# Patient Record
Sex: FEMALE | Race: WHITE | NOT HISPANIC OR LATINO | Employment: OTHER | URBAN - METROPOLITAN AREA
[De-identification: names, ages, dates, MRNs, and addresses within clinical notes are randomized per-mention and may not be internally consistent; named-entity substitution may affect disease eponyms.]

---

## 2019-04-22 ENCOUNTER — TRANSCRIBE ORDERS (OUTPATIENT)
Dept: ADMINISTRATIVE | Facility: HOSPITAL | Age: 84
End: 2019-04-22

## 2019-04-22 DIAGNOSIS — R26.81 UNSTEADINESS: Primary | ICD-10-CM

## 2019-04-29 ENCOUNTER — HOSPITAL ENCOUNTER (OUTPATIENT)
Dept: CARDIOLOGY | Facility: HOSPITAL | Age: 84
Discharge: HOME | End: 2019-04-29
Admitting: OTOLARYNGOLOGY

## 2019-04-29 DIAGNOSIS — R26.81 UNSTEADINESS: ICD-10-CM

## 2019-04-29 LAB
BH CV XLRA MEAS LEFT CCA RATIO VEL: 73.1 CM/SEC
BH CV XLRA MEAS LEFT DIST CCA EDV: 15.3 CM/SEC
BH CV XLRA MEAS LEFT DIST CCA PSV: 73.1 CM/SEC
BH CV XLRA MEAS LEFT DIST ICA EDV: -25.2 CM/SEC
BH CV XLRA MEAS LEFT DIST ICA PSV: -82.4 CM/SEC
BH CV XLRA MEAS LEFT ICA RATIO VEL: 87.9 CM/SEC
BH CV XLRA MEAS LEFT ICA/CCA RATIO: 1.2
BH CV XLRA MEAS LEFT MID ICA EDV: -18.8 CM/SEC
BH CV XLRA MEAS LEFT MID ICA PSV: -69.2 CM/SEC
BH CV XLRA MEAS LEFT PROX CCA EDV: 14.5 CM/SEC
BH CV XLRA MEAS LEFT PROX CCA PSV: 57.7 CM/SEC
BH CV XLRA MEAS LEFT PROX ECA EDV: -7.5 CM/SEC
BH CV XLRA MEAS LEFT PROX ECA PSV: -64 CM/SEC
BH CV XLRA MEAS LEFT PROX ICA EDV: 21.1 CM/SEC
BH CV XLRA MEAS LEFT PROX ICA PSV: 87.9 CM/SEC
BH CV XLRA MEAS LEFT PROX SCLA PSV: 114 CM/SEC
BH CV XLRA MEAS LEFT VERTEBRAL A EDV: -15.7 CM/SEC
BH CV XLRA MEAS LEFT VERTEBRAL A PSV: -51.9 CM/SEC
BH CV XLRA MEAS RIGHT CCA RATIO VEL: 49.1 CM/SEC
BH CV XLRA MEAS RIGHT DIST CCA EDV: 11.4 CM/SEC
BH CV XLRA MEAS RIGHT DIST CCA PSV: 49.1 CM/SEC
BH CV XLRA MEAS RIGHT DIST ICA EDV: -18.5 CM/SEC
BH CV XLRA MEAS RIGHT DIST ICA PSV: -81.3 CM/SEC
BH CV XLRA MEAS RIGHT ICA RATIO VEL: -94.3 CM/SEC
BH CV XLRA MEAS RIGHT ICA/CCA RATIO: -1.9
BH CV XLRA MEAS RIGHT MID ICA EDV: -21.6 CM/SEC
BH CV XLRA MEAS RIGHT MID ICA PSV: -94.3 CM/SEC
BH CV XLRA MEAS RIGHT PROX CCA EDV: 8.6 CM/SEC
BH CV XLRA MEAS RIGHT PROX CCA PSV: 47.5 CM/SEC
BH CV XLRA MEAS RIGHT PROX ECA EDV: -7.6 CM/SEC
BH CV XLRA MEAS RIGHT PROX ECA PSV: -69.8 CM/SEC
BH CV XLRA MEAS RIGHT PROX ICA EDV: -21.2 CM/SEC
BH CV XLRA MEAS RIGHT PROX ICA PSV: -77.8 CM/SEC
BH CV XLRA MEAS RIGHT PROX SCLA PSV: 104 CM/SEC
BH CV XLRA MEAS RIGHT VERTEBRAL A EDV: 11.8 CM/SEC
BH CV XLRA MEAS RIGHT VERTEBRAL A PSV: 57 CM/SEC
LEFT ARM BP: NORMAL MMHG
RIGHT ARM BP: NORMAL MMHG

## 2019-04-29 PROCEDURE — 93880 EXTRACRANIAL BILAT STUDY: CPT

## 2019-06-13 ENCOUNTER — OFFICE VISIT (OUTPATIENT)
Dept: PULMONOLOGY | Facility: CLINIC | Age: 84
End: 2019-06-13

## 2019-06-13 VITALS
HEART RATE: 71 BPM | DIASTOLIC BLOOD PRESSURE: 110 MMHG | SYSTOLIC BLOOD PRESSURE: 150 MMHG | WEIGHT: 113 LBS | OXYGEN SATURATION: 95 % | RESPIRATION RATE: 18 BRPM | HEIGHT: 59 IN | TEMPERATURE: 98.1 F | BODY MASS INDEX: 22.78 KG/M2

## 2019-06-13 DIAGNOSIS — R05.9 COUGH: Primary | ICD-10-CM

## 2019-06-13 DIAGNOSIS — R91.8 PULMONARY NODULES: ICD-10-CM

## 2019-06-13 PROCEDURE — 94726 PLETHYSMOGRAPHY LUNG VOLUMES: CPT | Performed by: INTERNAL MEDICINE

## 2019-06-13 PROCEDURE — 99203 OFFICE O/P NEW LOW 30 MIN: CPT | Performed by: INTERNAL MEDICINE

## 2019-06-13 PROCEDURE — 94729 DIFFUSING CAPACITY: CPT | Performed by: INTERNAL MEDICINE

## 2019-06-13 PROCEDURE — 94060 EVALUATION OF WHEEZING: CPT | Performed by: INTERNAL MEDICINE

## 2019-06-13 RX ORDER — IBUPROFEN 200 MG
TABLET ORAL EVERY 6 HOURS
COMMUNITY
End: 2022-10-10

## 2019-06-13 RX ORDER — SENNOSIDES 8.6 MG
1 CAPSULE ORAL DAILY PRN
COMMUNITY
End: 2021-01-26

## 2019-06-13 RX ORDER — ASPIRIN 81 MG/1
81 TABLET ORAL DAILY
COMMUNITY

## 2019-06-13 RX ORDER — HYDROCODONE BITARTRATE AND ACETAMINOPHEN 7.5; 325 MG/1; MG/1
1 TABLET ORAL EVERY 8 HOURS PRN
Refills: 0 | COMMUNITY
Start: 2019-05-11 | End: 2021-01-26

## 2019-06-13 RX ORDER — LOSARTAN POTASSIUM 50 MG/1
50 TABLET ORAL 2 TIMES DAILY
Refills: 1 | COMMUNITY
Start: 2019-05-23 | End: 2022-10-10

## 2019-06-13 RX ORDER — CIPROFLOXACIN 250 MG/1
250 TABLET, FILM COATED ORAL 2 TIMES DAILY
Refills: 0 | COMMUNITY
Start: 2019-06-08 | End: 2020-07-13

## 2019-06-13 RX ORDER — ROSUVASTATIN CALCIUM 10 MG/1
10 TABLET, COATED ORAL NIGHTLY
Refills: 1 | COMMUNITY
Start: 2019-03-16 | End: 2022-10-10 | Stop reason: SDUPTHER

## 2019-06-13 RX ORDER — CYCLOBENZAPRINE HCL 10 MG
TABLET ORAL
COMMUNITY
Start: 2019-06-11 | End: 2021-01-26

## 2019-06-13 RX ORDER — FUROSEMIDE 20 MG/1
20 TABLET ORAL 2 TIMES DAILY
COMMUNITY
End: 2022-10-10 | Stop reason: SDUPTHER

## 2019-06-13 RX ORDER — NEBIVOLOL HYDROCHLORIDE 10 MG/1
10 TABLET ORAL 2 TIMES DAILY
Refills: 1 | COMMUNITY
Start: 2019-05-23 | End: 2022-10-10 | Stop reason: SDUPTHER

## 2019-06-13 RX ORDER — LEVALBUTEROL TARTRATE 45 UG/1
3 AEROSOL, METERED ORAL ONCE
Status: COMPLETED | OUTPATIENT
Start: 2019-06-13 | End: 2019-06-13

## 2019-06-13 RX ADMIN — LEVALBUTEROL TARTRATE 3 PUFF: 45 AEROSOL, METERED ORAL at 10:51

## 2019-06-14 PROBLEM — R91.8 PULMONARY NODULES: Status: ACTIVE | Noted: 2019-06-14

## 2019-06-14 NOTE — PROGRESS NOTES
Initial Pulmonary Consult Note    Subjective   Reason for consultation/Chief Complaint: Pulmonary Nodules    Mounika Syed is a 87 y.o. female is being seen for consultation today at the request of Salo Luo, *    History of Present Illness  Ms. Syed is a 88yo F with a history of breast cancer who is referred for incidental pulmonary nodules. She had a fall in 2019 and had a CT scan of the chest performed which showed a few pulmonary nodules that ranged between 2mm and 4mm in size. She denies any shortness of breath or cough. She does have some weight loss which she attributes to lack of appetite from aging. She had breast cancer twice. The last time was 8-9 years ago and she was treated with lumpectomy and radiation. Her dad  from lung cancer in 1944.     Active Ambulatory Problems     Diagnosis Date Noted   • Pulmonary nodules 2019     Resolved Ambulatory Problems     Diagnosis Date Noted   • No Resolved Ambulatory Problems     Past Medical History:   Diagnosis Date   • Heart murmur        History reviewed. No pertinent surgical history.    History reviewed. No pertinent family history.    Social History     Socioeconomic History   • Marital status:      Spouse name: Not on file   • Number of children: Not on file   • Years of education: Not on file   • Highest education level: Not on file   Tobacco Use   • Smoking status: Current Every Day Smoker     Years: 30.00   • Smokeless tobacco: Never Used   Substance and Sexual Activity   • Alcohol use: No     Frequency: Never   • Drug use: No   • Sexual activity: Defer       No Known Allergies    Current Outpatient Medications   Medication Sig Dispense Refill   • aspirin 81 MG EC tablet Take 81 mg by mouth Daily.     • BYSTOLIC 10 MG tablet Take 10 mg by mouth 2 (Two) Times a Day.  1   • ciprofloxacin (CIPRO) 250 MG tablet Take 250 mg by mouth 2 (Two) Times a Day.  0   • cyclobenzaprine (FLEXERIL) 10 MG tablet      • furosemide  "(LASIX) 20 MG tablet Take 20 mg by mouth 2 (Two) Times a Day.     • HYDROcodone-acetaminophen (NORCO) 7.5-325 MG per tablet Take 1 tablet by mouth Every 8 (Eight) Hours As Needed.  0   • ibuprofen (ADVIL,MOTRIN) 200 MG tablet Every 6 (Six) Hours.     • losartan (COZAAR) 50 MG tablet Take 50 mg by mouth 2 (Two) Times a Day.  1   • rosuvastatin (CRESTOR) 10 MG tablet Take 10 mg by mouth Every Night.  1   • Sennosides (SENNA) 8.6 MG capsule Take 1 capsule by mouth Daily As Needed.     • VOLTAREN 1 % gel gel APPLY 2 - 4 GRAMS UP TO 4 XD TO THE PAINFUL JOINT UTD  3     No current facility-administered medications for this visit.        Review of Systems   Constitutional: Negative.    HENT: Positive for hearing loss, rhinorrhea, sinus pressure and tinnitus.    Eyes: Negative.    Respiratory: Negative.    Cardiovascular: Positive for leg swelling.   Gastrointestinal: Negative.    Endocrine: Negative.    Genitourinary: Negative.    Musculoskeletal: Positive for arthralgias and back pain.   Skin: Negative.    Allergic/Immunologic: Negative.    Neurological: Positive for dizziness.   Hematological: Negative.    Psychiatric/Behavioral: Negative.          Objective   Blood pressure (!) 150/110, pulse 71, temperature 98.1 °F (36.7 °C), resp. rate 18, height 148.6 cm (58.5\"), weight 51.3 kg (113 lb), SpO2 95 %.  Physical Exam   Constitutional: She is oriented to person, place, and time. She appears well-developed and well-nourished. No distress.   HENT:   Head: Normocephalic and atraumatic.   Mouth/Throat: Oropharynx is clear and moist.   Eyes: Conjunctivae are normal. Pupils are equal, round, and reactive to light. No scleral icterus.   Neck: Normal range of motion. Neck supple. No tracheal deviation present. No thyromegaly present.   Cardiovascular: Normal rate, regular rhythm and normal heart sounds.   Pulmonary/Chest: Effort normal and breath sounds normal. No respiratory distress.   Abdominal: Soft. Bowel sounds are normal. " There is no tenderness.   Musculoskeletal: Normal range of motion. She exhibits no edema.   Lymphadenopathy:     She has no cervical adenopathy.   Neurological: She is alert and oriented to person, place, and time. She exhibits normal muscle tone. Coordination normal.   Skin: Skin is warm and dry. No rash noted. No erythema.   Psychiatric: She has a normal mood and affect. Her speech is normal and behavior is normal. Judgment normal.   Nursing note and vitals reviewed.      PFTs:  Performed in clinic today and reviewed.   There is no airway obstruction. There is no significant response to bronchodilators.   There is no restriction. There is air trapping and hyperinflation present.   The DLCO is reduced.     Imaging:  CT Chest from April 2019 personally reviewed. There are multiple 2-4mm nodules within the left upper lobe. There is no significant adenopathy.     Assessment/Plan   Mounika was seen today for cough.    Diagnoses and all orders for this visit:    Cough  -     levalbuterol (XOPENEX HFA) inhaler 3 puff  -     Pulmonary Function Test    Pulmonary nodules        Discussion:  Ms. Syed is a 86yo F who is referred for pulmonary nodules.     1. 2-4mm Pulmonary Nodules  - She does not have any history of lung cancer. She does have a previous history of breast cancer.   - Due to her past history of cancer, we will plan to repeat a CT scan of the chest 1 year from her prior CT scan which was in April.     She will follow up after the CT scan. I have advised her to call with any questions or concerns.            Amarilis SOLIS Case, DO  Pulmonary and Critical Care Medicine  Note Electronically Signed

## 2019-06-17 DIAGNOSIS — R91.8 PULMONARY NODULES: Primary | ICD-10-CM

## 2020-05-06 ENCOUNTER — DOCUMENTATION (OUTPATIENT)
Dept: PULMONOLOGY | Facility: CLINIC | Age: 85
End: 2020-05-06

## 2020-05-06 NOTE — PROGRESS NOTES
I sent a certified letter to remind Ms. Syed to schedule her CT scan that was due in 1 year.  We will await delivery of this letter to see if patient will schedule her CT scan.

## 2020-05-07 ENCOUNTER — APPOINTMENT (OUTPATIENT)
Dept: CT IMAGING | Facility: HOSPITAL | Age: 85
End: 2020-05-07

## 2020-06-23 DIAGNOSIS — R91.8 PULMONARY NODULES: Primary | ICD-10-CM

## 2020-06-30 ENCOUNTER — TRANSCRIBE ORDERS (OUTPATIENT)
Dept: PULMONOLOGY | Facility: CLINIC | Age: 85
End: 2020-06-30

## 2020-07-13 ENCOUNTER — OFFICE VISIT (OUTPATIENT)
Dept: PULMONOLOGY | Facility: CLINIC | Age: 85
End: 2020-07-13

## 2020-07-13 VITALS
WEIGHT: 113 LBS | HEART RATE: 80 BPM | HEIGHT: 58 IN | BODY MASS INDEX: 23.72 KG/M2 | SYSTOLIC BLOOD PRESSURE: 122 MMHG | DIASTOLIC BLOOD PRESSURE: 80 MMHG | TEMPERATURE: 97.5 F | OXYGEN SATURATION: 97 %

## 2020-07-13 DIAGNOSIS — R91.8 PULMONARY NODULES: Primary | ICD-10-CM

## 2020-07-13 PROCEDURE — 99213 OFFICE O/P EST LOW 20 MIN: CPT | Performed by: NURSE PRACTITIONER

## 2020-07-13 RX ORDER — DOXAZOSIN MESYLATE 1 MG/1
1 TABLET ORAL 2 TIMES DAILY
COMMUNITY
Start: 2020-05-03 | End: 2022-10-10 | Stop reason: SDUPTHER

## 2020-07-13 NOTE — PROGRESS NOTES
"[facility]    CHIEF COMPLAINT    Pulmonary nodule    HISTORY OF PRESENT ILLNESS    Mounika Syed is a 88 y.o.female here today for follow-up of her pulmonary nodules.  She was last seen 1 year ago by Dr. Godoy.  She denies any respiratory illnesses or exacerbation since her last appointment.    She denies any changes to her medical history since her last appointment.    She denies fever, chills, sputum production, hemoptysis, night sweats, weight loss, chest pain or palpitations.  She denies any lower extremity edema or calf tenderness.  She denies any sinus or allergy symptoms.  She denies reflux symptoms.    Her biggest complaint is having pain from her arthritis.  She states that \"it hurts to sit, it hurts to move.\"    She is up-to-date on her current vaccinations.    Patient Active Problem List   Diagnosis   • Pulmonary nodules       No Known Allergies    Current Outpatient Medications:   •  aspirin 81 MG EC tablet, Take 81 mg by mouth Daily., Disp: , Rfl:   •  BYSTOLIC 10 MG tablet, Take 10 mg by mouth 2 (Two) Times a Day., Disp: , Rfl: 1  •  cyclobenzaprine (FLEXERIL) 10 MG tablet, , Disp: , Rfl:   •  doxazosin (CARDURA) 1 MG tablet, Take 1 mg by mouth 2 (Two) Times a Day., Disp: , Rfl:   •  furosemide (LASIX) 20 MG tablet, Take 20 mg by mouth 2 (Two) Times a Day., Disp: , Rfl:   •  HYDROcodone-acetaminophen (NORCO) 7.5-325 MG per tablet, Take 1 tablet by mouth Every 8 (Eight) Hours As Needed., Disp: , Rfl: 0  •  ibuprofen (ADVIL,MOTRIN) 200 MG tablet, Every 6 (Six) Hours., Disp: , Rfl:   •  losartan (COZAAR) 50 MG tablet, Take 50 mg by mouth 2 (Two) Times a Day., Disp: , Rfl: 1  •  rosuvastatin (CRESTOR) 10 MG tablet, Take 10 mg by mouth Every Night., Disp: , Rfl: 1  •  Sennosides (SENNA) 8.6 MG capsule, Take 1 capsule by mouth Daily As Needed., Disp: , Rfl:   •  VOLTAREN 1 % gel gel, APPLY 2 - 4 GRAMS UP TO 4 XD TO THE PAINFUL JOINT UTD, Disp: , Rfl: 3  MEDICATION LIST AND ALLERGIES " "REVIEWED.    Social History     Tobacco Use   • Smoking status: Current Every Day Smoker     Packs/day: 0.25     Years: 40.00     Pack years: 10.00     Types: Cigarettes   • Smokeless tobacco: Never Used   Substance Use Topics   • Alcohol use: No     Frequency: Never   • Drug use: No       FAMILY AND SOCIAL HISTORY REVIEWED.    Review of Systems   Constitutional: Negative for activity change, appetite change, fatigue, fever and unexpected weight change.   HENT: Negative for congestion, postnasal drip, rhinorrhea, sinus pressure, sore throat and voice change.    Eyes: Negative for visual disturbance.   Respiratory: Negative for cough, chest tightness, shortness of breath and wheezing.    Cardiovascular: Negative for chest pain, palpitations and leg swelling.   Gastrointestinal: Negative for abdominal distention, abdominal pain, nausea and vomiting.   Endocrine: Negative for cold intolerance and heat intolerance.   Genitourinary: Negative for difficulty urinating and urgency.   Musculoskeletal: Negative for arthralgias, back pain and neck pain.   Skin: Negative for color change and pallor.   Allergic/Immunologic: Negative for environmental allergies and food allergies.   Neurological: Negative for dizziness, syncope, weakness and light-headedness.   Hematological: Negative for adenopathy. Does not bruise/bleed easily.   Psychiatric/Behavioral: Negative for agitation and behavioral problems.   .    /80   Pulse 80   Temp 97.5 °F (36.4 °C)   Ht 147.3 cm (58\")   Wt 51.3 kg (113 lb)   LMP  (LMP Unknown)   SpO2 97% Comment: resting, room air  Breastfeeding No   BMI 23.62 kg/m²     Immunization History   Administered Date(s) Administered   • Flu Vaccine Quad PF >36MO 10/01/2014, 10/09/2015, 10/21/2016   • Fluzone High Dose =>65 Years ([facility] ONLY) 10/13/2018, 09/11/2019   • Hepatitis A 08/02/2018   • Pneumococcal Conjugate 13-Valent (PCV13) 11/21/2014   • Pneumococcal Polysaccharide (PPSV23) 11/01/2009 "       Physical Exam   Constitutional: She is oriented to person, place, and time. She appears well-developed and well-nourished.   HENT:   Head: Normocephalic and atraumatic.   Eyes: Pupils are equal, round, and reactive to light.   Neck: Normal range of motion. Neck supple. No thyromegaly present.   Cardiovascular: Normal rate, regular rhythm, normal heart sounds and intact distal pulses. Exam reveals no gallop and no friction rub.   No murmur heard.  Pulmonary/Chest: Effort normal and breath sounds normal. No respiratory distress. She has no wheezes. She has no rales. She exhibits no tenderness.   Abdominal: Soft. Bowel sounds are normal. There is no tenderness.   Musculoskeletal: Normal range of motion. She exhibits no edema.   Lymphadenopathy:     She has no cervical adenopathy.   Neurological: She is alert and oriented to person, place, and time.   Skin: Skin is warm and dry. Capillary refill takes less than 2 seconds. She is not diaphoretic.   Psychiatric: She has a normal mood and affect. Her behavior is normal.   Nursing note and vitals reviewed.        RESULTS    CT of the chest without contrast on 7/10/2020: Official report pending    PROBLEM LIST    Problem List Items Addressed This Visit        Respiratory    Pulmonary nodules - Primary            DISCUSSION    Ms. Syed is here for a follow-up CT scan that she had been last week.  We are following pulmonary nodules that she had last year.  I did review the CT scan images myself and it appears that they are unchanged.  I would like to get the report from the radiologist to compare and if they are changed or anything abnormal is present I will notify the patient.  I do not suspect that we need to repeat this scan at this time.  I will notify the patient if we need to do a repeat CT scan.    She will follow-up as needed in our office or sooner if her symptoms worsen.  I did advise her to call with any additional concerns or questions.  I spent 15 minutes  with the patient. I spent > 50% percent of this time counseling and discussing diagnosis, prognosis, diagnostic testing, evaluation, current status, treatment options and management.    Amy Guzman, JAEL  07/13/20203:27 PM  Electronically signed     Please note that portions of this note were completed with a voice recognition program. Efforts were made to edit the dictations, but occasionally words are mistranscribed.      CC: Salo Luo MD

## 2020-07-16 DIAGNOSIS — R91.8 PULMONARY NODULES: ICD-10-CM

## 2020-07-16 DIAGNOSIS — Z00.6 EXAMINATION FOR NORMAL COMPARISON FOR CLINICAL RESEARCH: Primary | ICD-10-CM

## 2021-01-12 ENCOUNTER — OFFICE VISIT (OUTPATIENT)
Dept: PULMONOLOGY | Facility: CLINIC | Age: 86
End: 2021-01-12

## 2021-01-12 VITALS
WEIGHT: 121 LBS | HEIGHT: 58 IN | BODY MASS INDEX: 25.4 KG/M2 | TEMPERATURE: 96.6 F | DIASTOLIC BLOOD PRESSURE: 90 MMHG | OXYGEN SATURATION: 95 % | SYSTOLIC BLOOD PRESSURE: 150 MMHG | HEART RATE: 71 BPM

## 2021-01-12 DIAGNOSIS — I10 ESSENTIAL HYPERTENSION: ICD-10-CM

## 2021-01-12 DIAGNOSIS — R91.8 PULMONARY NODULES: ICD-10-CM

## 2021-01-12 DIAGNOSIS — R05.9 COUGH: Primary | ICD-10-CM

## 2021-01-12 PROCEDURE — 99214 OFFICE O/P EST MOD 30 MIN: CPT | Performed by: NURSE PRACTITIONER

## 2021-01-12 RX ORDER — BENZONATATE 100 MG/1
100 CAPSULE ORAL 3 TIMES DAILY PRN
Qty: 42 CAPSULE | Refills: 3 | Status: SHIPPED | OUTPATIENT
Start: 2021-01-12 | End: 2021-01-12 | Stop reason: SDUPTHER

## 2021-01-12 RX ORDER — BENZONATATE 100 MG/1
100 CAPSULE ORAL 3 TIMES DAILY PRN
Qty: 42 CAPSULE | Refills: 3 | Status: SHIPPED | OUTPATIENT
Start: 2021-01-12 | End: 2021-01-26

## 2021-01-12 NOTE — PROGRESS NOTES
"[facility] Follow up      Chief Complaint  Lung Nodule and Follow-up    Subjective          Mounika Syed presents to [facility] for a chronic cough.  She states she has had a dry cough croupy type cough for the last 6 months.  She is followed up with her primary care.  She has tried with some allergy medication and nasal spray without change in the cough.  She says the cough has never produced any sputum.  She denies any sinus drainage or postnasal drainage.    She denies any reflux symptoms or dysphagia symptoms.  She denies associate the cough around eating.    She does think the cough is worse towards the evening.    She denies any wheezing.  She denies any shortness of breath however her mobility is very limited secondary to chronic spine issues and chronic pain.    She denies any lower extremity edema, chest pain or pressure.    She was seen here in the office for a incidental pulmonary nodule and April 2019.  She had a few scattered pulmonary nodule that ranged from 2 mm to 4 mm in size.  Her follow-up CT scan in June 2020 showed a calcified granuloma in the right upper lobe and some nodular scarring in the medial segment of the right middle lobe but no nodules.      Objective     Vital Signs:   /90   Pulse 71   Temp 96.6 °F (35.9 °C)   Ht 147.3 cm (58\")   Wt 54.9 kg (121 lb)   SpO2 95% Comment: resting, room air  BMI 25.29 kg/m²       Physical Exam  Vitals signs reviewed.   Constitutional:       General: She is not in acute distress.     Appearance: She is well-developed. She is not ill-appearing.   HENT:      Head: Normocephalic and atraumatic.      Mouth/Throat:      Mouth: Mucous membranes are moist.      Pharynx: Oropharynx is clear. No posterior oropharyngeal erythema.   Eyes:      Pupils: Pupils are equal, round, and reactive to light.   Neck:      Musculoskeletal: Normal range of motion and neck supple.   Cardiovascular:      Rate and Rhythm: " Normal rate and regular rhythm.      Heart sounds: No murmur.   Pulmonary:      Effort: Pulmonary effort is normal. No respiratory distress.      Breath sounds: Normal breath sounds. No wheezing or rales.   Abdominal:      General: Bowel sounds are normal. There is no distension.      Palpations: Abdomen is soft.   Musculoskeletal: Normal range of motion.   Skin:     General: Skin is warm and dry.      Findings: No erythema.   Neurological:      Mental Status: She is alert and oriented to person, place, and time.   Psychiatric:         Behavior: Behavior normal.          Result Review :       Data reviewed: Radiologic studies CT chest from June 2020                  Assessment and Plan    Problem List Items Addressed This Visit        Other    Pulmonary nodules    Essential hypertension      Other Visit Diagnoses     Cough    -  Primary          We reviewed her cough today in the office.  There is no clear indication where this chronic cough is coming from.  She has tried multiple allergy medications as well as nasal sprays without avail.  I will go ahead and give her a short course of an inhaled corticosteroid, Breo, to see if it is just an inflammatory cough.  Also give her some Tessalon Perles to take 3 times a day for the next several days to see if we can just get the cough to stop.    She will follow-up in 2 weeks for recheck.    If the cough persists we can pursue allergy testing, or further work-up for chronic reflux disease.  She may need a follow up chest xray as well.  She did not have any notable edema or rales in the office today.         I spent 25 minutes caring for Mounika on this date of service. This time includes time spent by me in the following activities:preparing for the visit, reviewing tests, obtaining and/or reviewing a separately obtained history, performing a medically appropriate examination and/or evaluation , counseling and educating the patient/family/caregiver, ordering medications,  tests, or procedures and documenting information in the medical record  Follow Up     Return in about 2 weeks (around 1/26/2021).  Patient was given instructions and counseling regarding her condition or for health maintenance advice. Please see specific information pulled into the AVS if appropriate.     JAEL Pacheco, ACNP  [facility]  Electronically signed

## 2021-01-26 ENCOUNTER — OFFICE VISIT (OUTPATIENT)
Dept: PULMONOLOGY | Facility: CLINIC | Age: 86
End: 2021-01-26

## 2021-01-26 VITALS
HEIGHT: 58 IN | HEART RATE: 72 BPM | OXYGEN SATURATION: 97 % | TEMPERATURE: 97.5 F | BODY MASS INDEX: 24.35 KG/M2 | WEIGHT: 116 LBS | SYSTOLIC BLOOD PRESSURE: 142 MMHG | DIASTOLIC BLOOD PRESSURE: 64 MMHG

## 2021-01-26 DIAGNOSIS — R05.9 COUGH: Primary | ICD-10-CM

## 2021-01-26 LAB
BASOPHILS # BLD AUTO: 0.03 10*3/MM3 (ref 0–0.2)
BASOPHILS NFR BLD AUTO: 0.4 % (ref 0–1.5)
DEPRECATED RDW RBC AUTO: 39.1 FL (ref 37–54)
EOSINOPHIL # BLD AUTO: 0.21 10*3/MM3 (ref 0–0.4)
EOSINOPHIL NFR BLD AUTO: 3 % (ref 0.3–6.2)
ERYTHROCYTE [DISTWIDTH] IN BLOOD BY AUTOMATED COUNT: 11.8 % (ref 12.3–15.4)
HCT VFR BLD AUTO: 33.1 % (ref 34–46.6)
HGB BLD-MCNC: 10.9 G/DL (ref 12–15.9)
IMM GRANULOCYTES # BLD AUTO: 0.03 10*3/MM3 (ref 0–0.05)
IMM GRANULOCYTES NFR BLD AUTO: 0.4 % (ref 0–0.5)
LYMPHOCYTES # BLD AUTO: 0.98 10*3/MM3 (ref 0.7–3.1)
LYMPHOCYTES NFR BLD AUTO: 13.9 % (ref 19.6–45.3)
MCH RBC QN AUTO: 30.2 PG (ref 26.6–33)
MCHC RBC AUTO-ENTMCNC: 32.9 G/DL (ref 31.5–35.7)
MCV RBC AUTO: 91.7 FL (ref 79–97)
MONOCYTES # BLD AUTO: 0.72 10*3/MM3 (ref 0.1–0.9)
MONOCYTES NFR BLD AUTO: 10.2 % (ref 5–12)
NEUTROPHILS NFR BLD AUTO: 5.09 10*3/MM3 (ref 1.7–7)
NEUTROPHILS NFR BLD AUTO: 72.1 % (ref 42.7–76)
NRBC BLD AUTO-RTO: 0 /100 WBC (ref 0–0.2)
PLATELET # BLD AUTO: 335 10*3/MM3 (ref 140–450)
PMV BLD AUTO: 9.6 FL (ref 6–12)
RBC # BLD AUTO: 3.61 10*6/MM3 (ref 3.77–5.28)
WBC # BLD AUTO: 7.06 10*3/MM3 (ref 3.4–10.8)

## 2021-01-26 PROCEDURE — 86606 ASPERGILLUS ANTIBODY: CPT | Performed by: NURSE PRACTITIONER

## 2021-01-26 PROCEDURE — 85025 COMPLETE CBC W/AUTO DIFF WBC: CPT | Performed by: NURSE PRACTITIONER

## 2021-01-26 PROCEDURE — 99214 OFFICE O/P EST MOD 30 MIN: CPT | Performed by: NURSE PRACTITIONER

## 2021-01-26 RX ORDER — DEXTROMETHORPHAN HYDROBROMIDE AND PROMETHAZINE HYDROCHLORIDE 15; 6.25 MG/5ML; MG/5ML
5 SYRUP ORAL
Qty: 118 ML | Refills: 0 | Status: SHIPPED | OUTPATIENT
Start: 2021-01-26 | End: 2021-05-25 | Stop reason: SDUPTHER

## 2021-01-26 RX ORDER — BENZONATATE 100 MG/1
100 CAPSULE ORAL 3 TIMES DAILY PRN
Qty: 42 CAPSULE | Refills: 3 | Status: SHIPPED | OUTPATIENT
Start: 2021-01-26 | End: 2022-09-27 | Stop reason: SDUPTHER

## 2021-01-26 NOTE — PROGRESS NOTES
"[facility] Follow up      Chief Complaint  Lung Nodule and Follow-up    Subjective          Mounika Syed presents to [facility] for follow-up on her chronic cough.  I saw her initially on January 12 with a complaint of a dry croupy cough that had been for about 6 months.  She had been on allergy medications and nasal sprays.  There is been no change with over-the-counter management of her cough.  She has never produced any sputum.  She denies any kind of sinus drainage or postnasal drainage.    During her last visit I did put her on some Breo to take daily for couple weeks to see if it improves the cough.  She did have a noticeable difference but not a \"significant\" difference.  I also recommended some Tessalon Perles 3 times a day, she forgot to go get those at the pharmacy and never started those.      She does have a history of a heart murmur.  She follows up with Dr. Patti Che.  She saw him a couple weeks ago she said she had a full work-up and there was no new issues.  She does have chronic lower extremity edema.  She is not on an ACE inhibitor.    Objective     Vital Signs:   /64   Pulse 72   Temp 97.5 °F (36.4 °C)   Ht 147.3 cm (58\")   Wt 52.6 kg (116 lb)   SpO2 97%   BMI 24.24 kg/m²       Physical Exam  Vitals signs reviewed.   Constitutional:       Appearance: She is well-developed.   HENT:      Head: Normocephalic and atraumatic.   Eyes:      Pupils: Pupils are equal, round, and reactive to light.   Neck:      Musculoskeletal: Normal range of motion and neck supple.   Cardiovascular:      Rate and Rhythm: Normal rate and regular rhythm.      Heart sounds: No murmur.   Pulmonary:      Effort: Pulmonary effort is normal. No respiratory distress.      Breath sounds: Normal breath sounds. No wheezing or rales.   Abdominal:      General: Bowel sounds are normal. There is no distension.      Palpations: Abdomen is soft.   Musculoskeletal: Normal range " of motion.   Skin:     General: Skin is warm and dry.      Findings: No erythema.   Neurological:      Mental Status: She is alert and oriented to person, place, and time.   Psychiatric:         Behavior: Behavior normal.          Result Review :                       Assessment and Plan    Problem List Items Addressed This Visit     None      Visit Diagnoses     Cough    -  Primary    Relevant Orders    CT Chest Hi Resolution Diagnostic    CBC & Differential    Aspergillus Antibodies    IgE Level    CBC Auto Differential          We did discuss that the cough may just be a irritated airway type cough.  Try to suppress the cough.  I would like for her to continue on the Breo for now as well as the Tessalon Perles.  I called those and again to her pharmacy for her to .  She is also having quite a bit of trouble sleeping secondary to the cough so I will call in a bit of promethazine cough syrup to take sparingly at night.  We did discuss risks and benefits of the medications today in the office.    Since it has been ongoing for so long now she is worried that she has some sort of airway issue.  She would like to pursue continued diagnostic testing.  We will follow up on a high resolution CT scan given her chronic dry cough.  Also check some labs.    I would like for her to follow-up in 6 to 8 weeks with a full PFT with Dr. Godoy.      I spent 35 minutes caring for Mounika on this date of service. This time includes time spent by me in the following activities:preparing for the visit, reviewing tests, obtaining and/or reviewing a separately obtained history, performing a medically appropriate examination and/or evaluation , counseling and educating the patient/family/caregiver, ordering medications, tests, or procedures, documenting information in the medical record and independently interpreting results and communicating that information with the patient/family/caregiver  Follow Up     Return in about 6 weeks  (around 3/9/2021).  Patient was given instructions and counseling regarding her condition or for health maintenance advice. Please see specific information pulled into the AVS if appropriate.     JAEL Pacheco, ACNP  [facility]  Electronically signed

## 2021-01-31 LAB
A FLAVUS AB SER QL ID: NEGATIVE
A FUMIGATUS AB SER QL ID: NEGATIVE
A NIGER AB SER QL ID: NEGATIVE

## 2021-02-05 ENCOUNTER — TRANSCRIBE ORDERS (OUTPATIENT)
Dept: PULMONOLOGY | Facility: CLINIC | Age: 86
End: 2021-02-05

## 2021-03-01 ENCOUNTER — CLINICAL SUPPORT (OUTPATIENT)
Dept: PULMONOLOGY | Facility: CLINIC | Age: 86
End: 2021-03-01

## 2021-03-01 DIAGNOSIS — Z01.812 BLOOD TESTS PRIOR TO TREATMENT OR PROCEDURE: Primary | ICD-10-CM

## 2021-03-01 PROCEDURE — 99211 OFF/OP EST MAY X REQ PHY/QHP: CPT | Performed by: INTERNAL MEDICINE

## 2021-03-01 PROCEDURE — U0004 COV-19 TEST NON-CDC HGH THRU: HCPCS | Performed by: INTERNAL MEDICINE

## 2021-03-02 LAB — SARS-COV-2 RNA RESP QL NAA+PROBE: NOT DETECTED

## 2021-03-04 ENCOUNTER — OFFICE VISIT (OUTPATIENT)
Dept: PULMONOLOGY | Facility: CLINIC | Age: 86
End: 2021-03-04

## 2021-03-04 ENCOUNTER — OFFICE VISIT (OUTPATIENT)
Dept: PULMONOLOGY | Facility: CLINIC | Age: 86
End: 2021-03-04
Payer: MEDICARE

## 2021-03-04 VITALS
SYSTOLIC BLOOD PRESSURE: 136 MMHG | TEMPERATURE: 98.6 F | BODY MASS INDEX: 24.35 KG/M2 | DIASTOLIC BLOOD PRESSURE: 80 MMHG | HEIGHT: 58 IN | WEIGHT: 116 LBS | HEART RATE: 84 BPM | OXYGEN SATURATION: 99 %

## 2021-03-04 DIAGNOSIS — Z00.6 EXAMINATION FOR NORMAL COMPARISON FOR CLINICAL RESEARCH: Primary | ICD-10-CM

## 2021-03-04 DIAGNOSIS — J43.2 CENTRILOBULAR EMPHYSEMA (HCC): ICD-10-CM

## 2021-03-04 DIAGNOSIS — R05.9 COUGH, UNSPECIFIED TYPE: Primary | ICD-10-CM

## 2021-03-04 DIAGNOSIS — R05.3 CHRONIC COUGH: Primary | ICD-10-CM

## 2021-03-04 PROCEDURE — 99214 OFFICE O/P EST MOD 30 MIN: CPT | Performed by: INTERNAL MEDICINE

## 2021-03-04 PROCEDURE — 94726 PLETHYSMOGRAPHY LUNG VOLUMES: CPT | Performed by: INTERNAL MEDICINE

## 2021-03-04 PROCEDURE — 94729 DIFFUSING CAPACITY: CPT | Performed by: INTERNAL MEDICINE

## 2021-03-04 PROCEDURE — 94375 RESPIRATORY FLOW VOLUME LOOP: CPT | Performed by: INTERNAL MEDICINE

## 2021-03-04 RX ORDER — LOSARTAN POTASSIUM 100 MG/1
100 TABLET ORAL 2 TIMES DAILY
COMMUNITY
Start: 2021-02-23 | End: 2022-10-10

## 2021-03-04 RX ORDER — AZITHROMYCIN 250 MG/1
TABLET, FILM COATED ORAL
Qty: 6 TABLET | Refills: 0 | Status: SHIPPED | OUTPATIENT
Start: 2021-03-04 | End: 2021-05-25

## 2021-03-04 RX ORDER — PREDNISONE 10 MG/1
TABLET ORAL
Qty: 30 TABLET | Refills: 0 | Status: SHIPPED | OUTPATIENT
Start: 2021-03-04 | End: 2021-05-25

## 2021-03-04 NOTE — PROGRESS NOTES
"Pulmonary Office Follow Up      Subjective   Chief Complaint: Cough    Mounika Syed is a 88 y.o. female is being seen in follow up for Cough    History of Present Illness    Ms. Syed is a 87yo F who was initially referred for incidental pulmonary nodules. She has since been seen back in clinic with her last visit on 1/26/21 with JAEL Pacheco for cough.     She has been continued on Breo and Tessalon pearls. She was given an Rx for promethazine cough syrup.    She returns to clinic today for follow up. She continues to have a cough but she thinks that Breo helps some. Her cough is worsened with talking. She has been tried on allergy meds and nasal sprays in the past without relief.     The following portions of the patient's history were reviewed and updated as appropriate: allergies, current medications, past family history, past medical history, past social history, past surgical history and problem list.    Review of Systems   Constitutional: Negative.    HENT: Positive for hearing loss, sinus pressure, sneezing and tinnitus.    Eyes: Positive for discharge.   Respiratory: Negative.    Cardiovascular: Negative.    Gastrointestinal: Negative.    Endocrine: Negative.    Genitourinary: Positive for frequency.   Musculoskeletal: Negative.    Skin: Negative.    Allergic/Immunologic: Negative.    Neurological: Positive for dizziness and light-headedness.   Hematological: Negative.    Psychiatric/Behavioral: Negative.          Objective   Blood pressure 136/80, pulse 84, temperature 98.6 °F (37 °C), height 147.3 cm (58\"), weight 52.6 kg (116 lb), SpO2 99 %, not currently breastfeeding.  Physical Exam  Vitals signs and nursing note reviewed.   Constitutional:       General: She is not in acute distress.     Appearance: She is well-developed.   HENT:      Head: Normocephalic and atraumatic.   Eyes:      General: No scleral icterus.     Conjunctiva/sclera: Conjunctivae normal.      Pupils: Pupils are equal, " round, and reactive to light.   Neck:      Musculoskeletal: Normal range of motion and neck supple.      Thyroid: No thyromegaly.      Trachea: No tracheal deviation.   Cardiovascular:      Rate and Rhythm: Normal rate and regular rhythm.      Heart sounds: Normal heart sounds.   Pulmonary:      Effort: Pulmonary effort is normal. No respiratory distress.      Breath sounds: Normal breath sounds.   Abdominal:      General: Bowel sounds are normal.      Palpations: Abdomen is soft.      Tenderness: There is no abdominal tenderness.   Musculoskeletal: Normal range of motion.   Lymphadenopathy:      Cervical: No cervical adenopathy.   Skin:     General: Skin is warm and dry.      Findings: No erythema or rash.   Neurological:      Mental Status: She is alert and oriented to person, place, and time.      Motor: No abnormal muscle tone.      Coordination: Coordination normal.   Psychiatric:         Speech: Speech normal.         Behavior: Behavior normal.         Judgment: Judgment normal.         PFTs:  Performed in clinic and personally reviewed.   There is no airway obstruction.  The lung volumes are normal.  The DLCO is normal.     Imaging:  HRCT from 2/25/21 reviewed. There is mild emphysema. There is bronchial wall thickening with mild centrilobular nodularity involving the right middle lobe and lingula. There is a calcified granuloma in the right upper lobe. There is no bronchiectasis.     Assessment/Plan   Diagnoses and all orders for this visit:    1. Chronic cough (Primary)    2. Centrilobular emphysema (CMS/HCC)    Other orders  -     azithromycin (ZITHROMAX) 250 MG tablet; Take 2 by mouth today then 1 daily for 4 days  Dispense: 6 tablet; Refill: 0  -     predniSONE (DELTASONE) 10 MG tablet; Take 4 tabs daily x 3 days, then take 3 tabs daily x 3 days, then take 2 tabs daily x 3 days, then take 1 tab daily x 3 days  Dispense: 30 tablet; Refill: 0        Discussion:  Ms. Syed is a 89yo F who is followed for  cough.     1. Chronic Cough  - PFTs performed in clinic today and negative for obstruction and restriction.   - Continue Breo as she feels that it does help some.   - CBC negative for eosinophils.   - Aspergillus antibodies were negative.   - Continue cough syrup as needed. She does not need refills today.   - I have sent in an Rx for Azithromycin and a Prednisone taper to see if it helps.   - I have counseled her to raise the head of the bed in case she has silent reflux contributing.   - If no improvement, may need to consider restarting antihistamines and nasal spray.     2. Emphysema  - Present on imaging but no obstruction on PFTs.   - She does have a history of smoking.   - Continue Breo. Could consider changing to a LAMA if cough not improved.     Follow up in 4-6 weeks to assess response to therapy.        I have spent 31 minutes reviewing the patient record, face to face with the patient in review of testing and further management and in documentation.     Amarilis SOLIS Case, DO  Pulmonary and Critical Care Medicine  Note Electronically Signed

## 2021-05-25 ENCOUNTER — OFFICE VISIT (OUTPATIENT)
Dept: PULMONOLOGY | Facility: CLINIC | Age: 86
End: 2021-05-25

## 2021-05-25 VITALS
OXYGEN SATURATION: 95 % | HEIGHT: 58 IN | DIASTOLIC BLOOD PRESSURE: 94 MMHG | BODY MASS INDEX: 24.22 KG/M2 | TEMPERATURE: 98 F | HEART RATE: 76 BPM | SYSTOLIC BLOOD PRESSURE: 138 MMHG | WEIGHT: 115.4 LBS

## 2021-05-25 DIAGNOSIS — R05.3 CHRONIC COUGH: Primary | ICD-10-CM

## 2021-05-25 DIAGNOSIS — J43.2 CENTRILOBULAR EMPHYSEMA (HCC): ICD-10-CM

## 2021-05-25 PROCEDURE — 99214 OFFICE O/P EST MOD 30 MIN: CPT | Performed by: INTERNAL MEDICINE

## 2021-05-25 RX ORDER — ALBUTEROL SULFATE 90 UG/1
2 AEROSOL, METERED RESPIRATORY (INHALATION) EVERY 4 HOURS PRN
Qty: 6.7 G | Refills: 11 | Status: SHIPPED | OUTPATIENT
Start: 2021-05-25

## 2021-05-25 RX ORDER — DEXTROMETHORPHAN HYDROBROMIDE AND PROMETHAZINE HYDROCHLORIDE 15; 6.25 MG/5ML; MG/5ML
5 SYRUP ORAL
Qty: 118 ML | Refills: 0 | Status: SHIPPED | OUTPATIENT
Start: 2021-05-25 | End: 2021-09-09

## 2021-05-25 NOTE — PROGRESS NOTES
"Pulmonary Office Follow Up      Subjective   Chief Complaint: Cough    Mounika Syed is a 89 y.o. female is being seen in follow up for Cough    History of Present Illness    Ms. Syed is a 89yo F who is followed for cough.     She was last seen in clinic on 3/4/21. She was treated with a course of Azithromycin and Prednisone to see if it would help.     She returns to clinic today for follow up. She notes that her cough completely resolved with Azithromycin and Prednisone but then returned a few days later. She is using Breo but notes that it drys her mouth out.     The following portions of the patient's history were reviewed and updated as appropriate: allergies, current medications, past family history, past medical history, past social history, past surgical history and problem list.    Review of Systems   Constitutional: Negative.    HENT: Positive for mouth sores.    Eyes: Negative.    Respiratory: Positive for cough.    Cardiovascular: Negative.    Gastrointestinal: Negative.    Endocrine: Negative.    Genitourinary: Negative.    Musculoskeletal: Negative.    Skin: Negative.    Allergic/Immunologic: Negative.    Hematological: Negative.    Psychiatric/Behavioral: Negative.          Objective   Blood pressure 138/94, pulse 76, temperature 98 °F (36.7 °C), height 147.3 cm (58\"), weight 52.3 kg (115 lb 6.4 oz), SpO2 95 %, not currently breastfeeding.  Physical Exam  Vitals and nursing note reviewed.   Constitutional:       General: She is not in acute distress.     Appearance: She is well-developed.   HENT:      Head: Normocephalic and atraumatic.   Eyes:      General: No scleral icterus.     Conjunctiva/sclera: Conjunctivae normal.      Pupils: Pupils are equal, round, and reactive to light.   Neck:      Thyroid: No thyromegaly.      Trachea: No tracheal deviation.   Cardiovascular:      Rate and Rhythm: Normal rate and regular rhythm.      Heart sounds: Normal heart sounds.   Pulmonary:      Effort: " Pulmonary effort is normal. No respiratory distress.      Breath sounds: Normal breath sounds.   Abdominal:      General: Bowel sounds are normal.      Palpations: Abdomen is soft.      Tenderness: There is no abdominal tenderness.   Musculoskeletal:         General: Normal range of motion.      Cervical back: Normal range of motion and neck supple.      Right lower leg: Edema present.      Left lower leg: Edema present.   Lymphadenopathy:      Cervical: No cervical adenopathy.   Skin:     General: Skin is warm and dry.      Findings: No erythema or rash.   Neurological:      Mental Status: She is alert and oriented to person, place, and time.      Motor: No abnormal muscle tone.      Coordination: Coordination normal.   Psychiatric:         Speech: Speech normal.         Behavior: Behavior normal.         Judgment: Judgment normal.         PFTs:  No new PFTs.     Imaging:  No new imaging.     Assessment/Plan   Diagnoses and all orders for this visit:    1. Chronic cough (Primary)    2. Centrilobular emphysema (CMS/HCC)    Other orders  -     promethazine-dextromethorphan (PROMETHAZINE-DM) 6.25-15 MG/5ML syrup; Take 5 mL by mouth every night at bedtime.  Dispense: 118 mL; Refill: 0  -     albuterol sulfate  (90 Base) MCG/ACT inhaler; Inhale 2 puffs Every 4 (Four) Hours As Needed for Wheezing.  Dispense: 6.7 g; Refill: 11        Discussion:  Ms. Syed is a 88yo F who is followed for cough.     1. Chronic Cough  - PFTs performed and negative for obstruction and restriction.   - Change Breo to Anoro. I have given her samples and asked her to call if she needs a refill.    - CBC negative for eosinophils.   - Aspergillus antibodies were negative.   - Continue cough syrup as needed. Refill sent to pharmacy.   - Albuterol rescue inhaler as needed.   - If no improvement, may need to consider restarting antihistamines and nasal spray.   - She is using pillows rather than elevating the head of the bed. If the cough  persists, she will likely need to elevate the head of the bed and we will treat reflux more aggressively.     2. Emphysema  - Present on imaging but no obstruction on PFTs.   - She does have a history of smoking.   - Change Breo to Anoro as above.     Follow up in 3-4 months to assess response to therapy.        Amarilis V Case, DO  Pulmonary and Critical Care Medicine  Note Electronically Signed

## 2021-09-09 RX ORDER — DEXTROMETHORPHAN HYDROBROMIDE AND PROMETHAZINE HYDROCHLORIDE 15; 6.25 MG/5ML; MG/5ML
5 SYRUP ORAL
Qty: 118 ML | Refills: 0 | Status: SHIPPED | OUTPATIENT
Start: 2021-09-09 | End: 2022-02-21

## 2022-02-21 RX ORDER — DEXTROMETHORPHAN HYDROBROMIDE AND PROMETHAZINE HYDROCHLORIDE 15; 6.25 MG/5ML; MG/5ML
5 SYRUP ORAL
Qty: 118 ML | Refills: 0 | Status: SHIPPED | OUTPATIENT
Start: 2022-02-21 | End: 2022-10-10

## 2022-09-27 RX ORDER — BENZONATATE 100 MG/1
100 CAPSULE ORAL 3 TIMES DAILY PRN
Qty: 42 CAPSULE | Refills: 0 | Status: SHIPPED | OUTPATIENT
Start: 2022-09-27 | End: 2022-10-10

## 2022-09-27 NOTE — TELEPHONE ENCOUNTER
Fax refill request for Rx Benzonatate 100 approved for 1 month supply. Pt will need to schedule an apt before any further refills.

## 2022-10-10 ENCOUNTER — OFFICE VISIT (OUTPATIENT)
Dept: FAMILY MEDICINE CLINIC | Facility: CLINIC | Age: 87
End: 2022-10-10

## 2022-10-10 VITALS
DIASTOLIC BLOOD PRESSURE: 70 MMHG | SYSTOLIC BLOOD PRESSURE: 130 MMHG | BODY MASS INDEX: 23.62 KG/M2 | WEIGHT: 105 LBS | HEIGHT: 56 IN

## 2022-10-10 DIAGNOSIS — K21.9 GERD WITHOUT ESOPHAGITIS: ICD-10-CM

## 2022-10-10 DIAGNOSIS — M15.9 GENERALIZED OSTEOARTHRITIS OF MULTIPLE SITES: ICD-10-CM

## 2022-10-10 DIAGNOSIS — H91.8X3 OTHER SPECIFIED HEARING LOSS OF BOTH EARS: ICD-10-CM

## 2022-10-10 DIAGNOSIS — E78.2 HYPERLIPIDEMIA, MIXED: ICD-10-CM

## 2022-10-10 DIAGNOSIS — G47.00 INSOMNIA, PERSISTENT: ICD-10-CM

## 2022-10-10 DIAGNOSIS — I10 ESSENTIAL HYPERTENSION: Chronic | ICD-10-CM

## 2022-10-10 DIAGNOSIS — I35.0 AORTIC STENOSIS, MODERATE: ICD-10-CM

## 2022-10-10 DIAGNOSIS — Z00.00 GENERAL MEDICAL EXAM: Primary | ICD-10-CM

## 2022-10-10 DIAGNOSIS — R01.1 HEART MURMUR: Chronic | ICD-10-CM

## 2022-10-10 DIAGNOSIS — F43.21 GRIEVING: ICD-10-CM

## 2022-10-10 DIAGNOSIS — H61.23 IMPACTED CERUMEN OF BOTH EARS: ICD-10-CM

## 2022-10-10 DIAGNOSIS — J43.2 CENTRILOBULAR EMPHYSEMA: Chronic | ICD-10-CM

## 2022-10-10 DIAGNOSIS — R42 DIZZINESS: ICD-10-CM

## 2022-10-10 DIAGNOSIS — R05.3 CHRONIC COUGH: Chronic | ICD-10-CM

## 2022-10-10 PROBLEM — IMO0001 IMPAIRED HEARING: Status: ACTIVE | Noted: 2022-10-10

## 2022-10-10 PROBLEM — H91.90 IMPAIRED HEARING: Status: ACTIVE | Noted: 2022-10-10

## 2022-10-10 PROCEDURE — 36415 COLL VENOUS BLD VENIPUNCTURE: CPT | Performed by: FAMILY MEDICINE

## 2022-10-10 PROCEDURE — 1170F FXNL STATUS ASSESSED: CPT | Performed by: FAMILY MEDICINE

## 2022-10-10 PROCEDURE — 99397 PER PM REEVAL EST PAT 65+ YR: CPT | Performed by: FAMILY MEDICINE

## 2022-10-10 PROCEDURE — 96160 PT-FOCUSED HLTH RISK ASSMT: CPT | Performed by: FAMILY MEDICINE

## 2022-10-10 PROCEDURE — 1159F MED LIST DOCD IN RCRD: CPT | Performed by: FAMILY MEDICINE

## 2022-10-10 PROCEDURE — G0439 PPPS, SUBSEQ VISIT: HCPCS | Performed by: FAMILY MEDICINE

## 2022-10-10 PROCEDURE — 99214 OFFICE O/P EST MOD 30 MIN: CPT | Performed by: FAMILY MEDICINE

## 2022-10-10 RX ORDER — LOSARTAN POTASSIUM 100 MG/1
100 TABLET ORAL DAILY
Qty: 90 TABLET | Refills: 1 | Status: SHIPPED | OUTPATIENT
Start: 2022-10-10

## 2022-10-10 RX ORDER — MELOXICAM 7.5 MG/1
7.5 TABLET ORAL DAILY
COMMUNITY
End: 2022-10-10 | Stop reason: SDUPTHER

## 2022-10-10 RX ORDER — TRAZODONE HYDROCHLORIDE 50 MG/1
50 TABLET ORAL NIGHTLY
COMMUNITY
End: 2022-10-10

## 2022-10-10 RX ORDER — NEBIVOLOL 10 MG/1
10 TABLET ORAL DAILY
Qty: 90 TABLET | Refills: 1 | Status: SHIPPED | OUTPATIENT
Start: 2022-10-10

## 2022-10-10 RX ORDER — OMEPRAZOLE 40 MG/1
40 CAPSULE, DELAYED RELEASE ORAL DAILY
Qty: 90 CAPSULE | Refills: 1 | Status: SHIPPED | OUTPATIENT
Start: 2022-10-10

## 2022-10-10 RX ORDER — FUROSEMIDE 20 MG/1
20 TABLET ORAL DAILY
Qty: 90 TABLET | Refills: 1 | Status: SHIPPED | OUTPATIENT
Start: 2022-10-10

## 2022-10-10 RX ORDER — MELOXICAM 7.5 MG/1
7.5 TABLET ORAL DAILY
Qty: 90 TABLET | Refills: 1 | Status: SHIPPED | OUTPATIENT
Start: 2022-10-10

## 2022-10-10 RX ORDER — ROSUVASTATIN CALCIUM 10 MG/1
10 TABLET, COATED ORAL NIGHTLY
Qty: 90 TABLET | Refills: 1 | Status: SHIPPED | OUTPATIENT
Start: 2022-10-10

## 2022-10-10 RX ORDER — DOXAZOSIN MESYLATE 1 MG/1
1 TABLET ORAL NIGHTLY
Qty: 90 TABLET | Refills: 1 | Status: SHIPPED | OUTPATIENT
Start: 2022-10-10

## 2022-10-10 RX ORDER — AMITRIPTYLINE HYDROCHLORIDE 10 MG/1
10 TABLET, FILM COATED ORAL NIGHTLY
COMMUNITY
End: 2022-10-10 | Stop reason: SDUPTHER

## 2022-10-10 RX ORDER — AMITRIPTYLINE HYDROCHLORIDE 10 MG/1
10 TABLET, FILM COATED ORAL NIGHTLY
Qty: 90 TABLET | Refills: 1 | Status: SHIPPED | OUTPATIENT
Start: 2022-10-10

## 2022-10-10 RX ORDER — OMEPRAZOLE 40 MG/1
40 CAPSULE, DELAYED RELEASE ORAL DAILY
COMMUNITY
End: 2022-10-10 | Stop reason: SDUPTHER

## 2022-10-10 NOTE — ASSESSMENT & PLAN NOTE
Work-up with pulmonology and diagnosis of emphysema.  On Breo with as needed albuterol inhaler.  Secondhand smoke

## 2022-10-10 NOTE — ASSESSMENT & PLAN NOTE
Did very well with low-dose amitriptyline for neuropathy and nighttime insomnia issues.  This also helps sustain appetite.    She has been off trazodone.    Refilled amitriptyline

## 2022-10-10 NOTE — ASSESSMENT & PLAN NOTE
Lipid abnormalities are improving with treatment.  Pharmacotherapy as ordered.  Lipids will be reassessed in 6 months.    Has been out of Crestor but will restart.  She would like to go ahead and get blood work done today even though she knows the levels may be affected by her running out of Crestor

## 2022-10-10 NOTE — ASSESSMENT & PLAN NOTE
Continues with Mobic low-dose and Tylenol.  Onset understands long-term risk with Mobic but it greatly helps quality of life

## 2022-10-10 NOTE — PROGRESS NOTES
QUICK REFERENCE INFORMATION:  The ABCs of the Annual Wellness Visit    Subsequent Medicare Wellness Visit    @[email or web address]@    HEALTH RISK ASSESSMENT    3/10/1932    Recent Hospitalizations:  No hospitalization(s) within the last year..        Current Medical Providers:  Patient Care Team:  Salo Luo MD as PCP - General (Family Medicine)  Zeeshan Tai MD as Consulting Physician (Pain Medicine)        Smoking Status:  Social History     Tobacco Use   Smoking Status Former   • Packs/day: 0.25   • Years: 40.00   • Pack years: 10.00   • Types: Cigarettes   • Quit date: 1999   • Years since quittin.7   Smokeless Tobacco Never       Alcohol Consumption:  Social History     Substance and Sexual Activity   Alcohol Use No       Depression Screen:   PHQ-2/PHQ-9 Depression Screening 10/10/2022   Little Interest or Pleasure in Doing Things 1-->several days   Feeling Down, Depressed or Hopeless 1-->several days   Trouble Falling or Staying Asleep, or Sleeping Too Much 1-->several days   Feeling Tired or Having Little Energy 1-->several days   Poor Appetite or Overeating 1-->several days   Feeling Bad about Yourself - or that You are a Failure or Have Let Yourself or Your Family Down 1-->several days   Trouble Concentrating on Things, Such as Reading the Newspaper or Watching Television 1-->several days   Moving or Speaking So Slowly that Other People Could Have Noticed? Or the Opposite - Being So Fidgety 1-->several days   Thoughts that You Would be Better Off Dead or of Hurting Yourself in Some Way 0-->not at all   PHQ-9: Brief Depression Severity Measure Score 8   If You Checked Off Any Problems, How Difficult Have These Problems Made It For You to Do Your Work, Take Care of Things at Home, or Get Along with Other People? not difficult at all       Health Habits and Functional and Cognitive Screening:  Functional & Cognitive Status 10/10/2022   Do you have difficulty preparing food and eating? No   Do  you have difficulty bathing yourself, getting dressed or grooming yourself? No   Do you have difficulty using the toilet? No   Do you have difficulty moving around from place to place? No   Do you have trouble with steps or getting out of a bed or a chair? No   Current Diet Well Balanced Diet   Dental Exam Not up to date   Eye Exam Up to date   Exercise (times per week) 0 times per week   Current Exercises Include No Regular Exercise   Do you need help using the phone?  No   Are you deaf or do you have serious difficulty hearing?  No   Do you need help with transportation? No   Do you need help shopping? No   Do you need help preparing meals?  No   Do you need help with housework?  No   Do you need help with laundry? No   Do you need help taking your medications? No   Do you need help managing money? No   Do you ever drive or ride in a car without wearing a seat belt? No   Have you felt unusual stress, anger or loneliness in the last month? No   Who do you live with? Alone   If you need help, do you have trouble finding someone available to you? No   Have you been bothered in the last four weeks by sexual problems? No   Do you have difficulty concentrating, remembering or making decisions? No       Fall Risk Screen:  STEADI Fall Risk Assessment was completed, and patient is at LOW risk for falls.Assessment completed on:10/10/2022    ACE III MINI        Does the patient have evidence of cognitive impairment? No    Aspirin use counseling: Taking ASA appropriately as indicated    Recent Lab Results:  CMP:     HbA1c:  No results found for: HGBA1C  Microalbumin:  No results found for: MICROALBUR, POCMALB, POCCREAT  Lipid Panel  No results found for: CHOL, TRIG, HDL, LDL, AST, ALT    Visual Acuity:  No results found.    Age-appropriate Screening Schedule:  Refer to the list below for future screening recommendations based on patient's age, sex and/or medical conditions. Orders for these recommended tests are listed in the  plan section. The patient has been provided with a written plan.    Health Maintenance   Topic Date Due   • TDAP/TD VACCINES (1 - Tdap) Never done   • ZOSTER VACCINE (1 of 2) Never done   • INFLUENZA VACCINE  08/01/2022   • DXA SCAN  10/09/2023 (Originally 5/7/2020)   • LIPID PANEL  12/01/2022        Subjective   History of Present Illness    Mounika Syed is a 89 y.o. female who presents for a Subsequent Wellness Visit.    She is also here for medication refills and blood work.    She just recently lost her son due to the terminal bladder cancer complications on Saturday and unfortunately her daughter has terminal leukemia.  She has buried two 's and her daughter is her last living child.    She does feel that she is managing things okay but there has been a lot of loss for her in the recent past.    History of emphysema and has been on Breo with as needed albuterol inhaler from her pulmonologist.    Unable to travel to [place] to see Dr. Michael and is interested in consultation with Dr. Elizabeth to establish care given history of heart murmur/aortic stenosis, hypertension, hyperlipidemia, and desire to follow-up with cardiology.    She has been out of some of her medications including her Crestor but would like to go ahead and get blood work up-to-date.    Chronic insomnia and neuropathy symptoms did much better with low-dose amitriptyline compared with trazodone for insomnia.  She would like to restart low-dose amitriptyline.    She does need refills on all of her medications today.    Having some difficulties with hearing and ear fullness and feels she may have an accumulation of earwax again.    Cardiology had her on 1 mg of Cardura twice daily and 100 mg of losartan twice daily.  We did discuss that typically the Cardura is best taken at bedtime given some dizziness problems this can cause and I would only recommend the max dosing of her losartan to be 100 mg daily.  She is willing to make these  medication adjustments.    Discussed grief counseling through hospice given her recent loss of her son and expected loss of her daughter this year from terminal leukemia.  She will consider but understands recommendation and option for free grief counseling through hospice      CHRONIC CONDITIONS    The following portions of the patient's history were reviewed and updated as appropriate: allergies, current medications, past family history, past medical history, past social history, past surgical history and problem list.    Outpatient Medications Prior to Visit   Medication Sig Dispense Refill   • aspirin 81 MG EC tablet Take 81 mg by mouth Daily.     • Fluticasone Furoate-Vilanterol (Breo Ellipta) 200-25 MCG/INH inhaler Inhale 1 puff Daily. 1 each 5   • amitriptyline (ELAVIL) 10 MG tablet Take 1 tablet by mouth Every Night.     • benzonatate (TESSALON) 100 MG capsule Take 1 capsule by mouth 3 (Three) Times a Day As Needed for Cough. 42 capsule 0   • BYSTOLIC 10 MG tablet Take 10 mg by mouth 2 (Two) Times a Day.  1   • doxazosin (CARDURA) 1 MG tablet Take 1 mg by mouth 2 (Two) Times a Day.     • furosemide (LASIX) 20 MG tablet Take 20 mg by mouth 2 (Two) Times a Day.     • ibuprofen (ADVIL,MOTRIN) 200 MG tablet Every 6 (Six) Hours.     • losartan (COZAAR) 100 MG tablet Take 100 mg by mouth 2 (Two) Times a Day.     • losartan (COZAAR) 50 MG tablet Take 50 mg by mouth 2 (Two) Times a Day.  1   • meloxicam (MOBIC) 7.5 MG tablet Take 1 tablet by mouth Daily.     • omeprazole (priLOSEC) 40 MG capsule Take 1 capsule by mouth Daily.     • promethazine-dextromethorphan (PROMETHAZINE-DM) 6.25-15 MG/5ML syrup TAKE 5 ML BY MOUTH EVERY NIGHT AT BEDTIME 118 mL 0   • promethazine-dextromethorphan (PROMETHAZINE-DM) 6.25-15 MG/5ML syrup TAKE 5 ML BY MOUTH EVERY NIGHT AT BEDTIME 118 mL 0   • rosuvastatin (CRESTOR) 10 MG tablet Take 10 mg by mouth Every Night.  1   • traZODone (DESYREL) 50 MG tablet Take 1 tablet by mouth Every  Night.     • VOLTAREN 1 % gel gel APPLY 2 - 4 GRAMS UP TO 4 XD TO THE PAINFUL JOINT UTD  3   • albuterol sulfate  (90 Base) MCG/ACT inhaler Inhale 2 puffs Every 4 (Four) Hours As Needed for Wheezing. 6.7 g 11     No facility-administered medications prior to visit.       Patient Active Problem List   Diagnosis   • Pulmonary nodules   • Essential hypertension   • Heart murmur   • Chronic cough   • Centrilobular emphysema (HCC)   • Hyperlipidemia, mixed   • Generalized osteoarthritis of multiple sites   • GERD without esophagitis   • Insomnia, persistent   • Impacted cerumen of both ears   • Dizziness   • Impaired hearing   • Grieving   • Aortic stenosis, moderate   • General medical exam       Advance Care Planning:  ACP discussion was held with the patient during this visit. Patient has an advance directive (not in EMR), copy requested.    Identification of Risk Factors:  Risk factors include: Advance Directive Discussion.    Review of Systems   Constitutional: Negative for appetite change, chills, fever and unexpected weight change.   HENT: Positive for hearing loss.         Muffled hearing with ear fullness.  Feels that her earwax buildup may have recurred.  Having episodes with dizziness.   Eyes: Negative for visual disturbance.   Respiratory: Negative for chest tightness, shortness of breath and wheezing.    Cardiovascular: Negative for chest pain, palpitations and leg swelling.   Gastrointestinal: Negative for abdominal pain.   Musculoskeletal: Negative for arthralgias, back pain and gait problem.   Skin: Negative for rash.   Neurological: Positive for dizziness. Negative for headaches.   Psychiatric/Behavioral: Negative for agitation and confusion. The patient is not nervous/anxious.         Grieving the loss of her son this weekend along with her daughter's history of leukemia that they have told her will be terminal.  She has lost 2 husbands and has been more distant with her Jain given some  "difficulties and disagreements with her .       Compared to one year ago, the patient feels her physical health is the same.  Compared to one year ago, the patient feels her mental health is the same.    Objective     Physical Exam  Constitutional:       Appearance: Normal appearance.   HENT:      Head: Normocephalic.      Right Ear: External ear normal. There is impacted cerumen.      Left Ear: External ear normal. There is impacted cerumen.      Ears:      Comments: Bilateral cerumen impaction and hearing difficulty.    Cerumen impaction resolved with office removal.  Hearing improved.  Fullness improved     Nose: Nose normal.   Eyes:      Pupils: Pupils are equal, round, and reactive to light.   Cardiovascular:      Rate and Rhythm: Normal rate and regular rhythm.      Heart sounds: Murmur heard.      Comments: 3 out of 6 to 4 out of 6 systolic ejection murmur heard best at left upper sternal border.  Pulmonary:      Effort: Pulmonary effort is normal.      Breath sounds: Normal breath sounds.   Musculoskeletal:         General: Normal range of motion.      Cervical back: Normal range of motion.   Skin:     General: Skin is warm and dry.   Neurological:      General: No focal deficit present.      Mental Status: She is alert.   Psychiatric:         Mood and Affect: Mood normal.         Behavior: Behavior normal.         Thought Content: Thought content normal.          Procedures     Vitals:    10/10/22 1008   BP: 130/70   BP Location: Left arm   Patient Position: Sitting   Cuff Size: Small Adult   Weight: 47.6 kg (105 lb)   Height: 142.2 cm (56\")       BMI is within normal parameters. No other follow-up for BMI required.      Lab Results   Component Value Date    WBC 7.06 01/26/2021    HGB 10.9 (L) 01/26/2021    HCT 33.1 (L) 01/26/2021    MCV 91.7 01/26/2021     01/26/2021     No results found for: GLUCOSE, BUN, CREATININE, EGFRIFNONA, EGFRIFAFRI, BCR, K, CO2, CALCIUM, PROTENTOTREF, ALBUMIN, LABIL2, " BILIRUBIN, AST, ALT  No results found for: TSH  No results found for: PSA  No results found for: CHOL, CHLPL, TRIG, HDL, LDL, LDLDIRECT    Assessment & Plan   Problem List Items Addressed This Visit        Cardiac and Vasculature    Essential hypertension (Chronic)    Relevant Medications    doxazosin (CARDURA) 1 MG tablet    furosemide (LASIX) 20 MG tablet    losartan (COZAAR) 100 MG tablet    nebivolol (Bystolic) 10 MG tablet    Other Relevant Orders    Ambulatory Referral to Cardiology    CBC Auto Differential    Comprehensive Metabolic Panel    Lipid Panel    TSH    T4, Free    Heart murmur (Chronic)    Current Assessment & Plan     Scheduling consultation with Dr. Elizabeth to establish care         Relevant Orders    Ambulatory Referral to Cardiology    Hyperlipidemia, mixed (Chronic)    Current Assessment & Plan     Lipid abnormalities are improving with treatment.  Pharmacotherapy as ordered.  Lipids will be reassessed in 6 months.    Has been out of Crestor but will restart.  She would like to go ahead and get blood work done today even though she knows the levels may be affected by her running out of Crestor         Relevant Medications    rosuvastatin (CRESTOR) 10 MG tablet    Other Relevant Orders    Ambulatory Referral to Cardiology    CBC Auto Differential    Comprehensive Metabolic Panel    Lipid Panel    TSH    T4, Free    Aortic stenosis, moderate    Current Assessment & Plan     Scheduling consultation to establish care with Dr. Elizabeth         Relevant Medications    nebivolol (Bystolic) 10 MG tablet       ENT    Impacted cerumen of both ears    Current Assessment & Plan     Resolved with lavage in office today         Impaired hearing    Current Assessment & Plan     Improved with removal of cerumen impaction            Gastrointestinal Abdominal     GERD without esophagitis (Chronic)    Current Assessment & Plan     Continue omeprazole for GERD and GI protection with Mobic         Relevant  Medications    omeprazole (priLOSEC) 40 MG capsule       Health Encounters    General medical exam - Primary    Current Assessment & Plan     Reviewed annual wellness visit along with health maintenance and screening.    She will consider vaccination update but declines DEXA.    Encouraged her to get a copy of her advance directives on chart with us.      Discussed her recent loss of her son and her daughter's terminal illness and grief counseling recommendation.  She will give it some consideration.    We will get her established with Dr. Elizabeth due to her aortic stenosis and episodes of dizziness.  We did discuss reducing her Cardura to 1 mg at bedtime given some concerns this might be contributing to dizziness along with reduction in her losartan dosing to just 100 mg daily.  Recheck in 1 month or sooner if problems arise              Mental Health    Grieving    Current Assessment & Plan     See above.  Encourage hospice grief counseling            Musculoskeletal and Injuries    Generalized osteoarthritis of multiple sites (Chronic)    Current Assessment & Plan     Continues with Mobic low-dose and Tylenol.  Onset understands long-term risk with Mobic but it greatly helps quality of life         Relevant Medications    meloxicam (MOBIC) 7.5 MG tablet       Pulmonary and Pneumonias    Chronic cough (Chronic)    Current Assessment & Plan     Work-up with pulmonology and diagnosis of emphysema.  On Breo with as needed albuterol inhaler.  Secondhand smoke         Centrilobular emphysema (HCC) (Chronic)    Relevant Medications    Fluticasone Furoate-Vilanterol (Breo Ellipta) 200-25 MCG/INH inhaler    albuterol sulfate  (90 Base) MCG/ACT inhaler       Sleep    Insomnia, persistent (Chronic)    Current Assessment & Plan     Did very well with low-dose amitriptyline for neuropathy and nighttime insomnia issues.  This also helps sustain appetite.    She has been off trazodone.    Refilled amitriptyline          Relevant Medications    amitriptyline (ELAVIL) 10 MG tablet       Symptoms and Signs    Dizziness (Chronic)    Current Assessment & Plan     Left greater than right cerumen impaction resolved in the office today.    Discussed this may be contributing to her episodes or her medications could be contributing (1 mg doxazosin twice daily and 100 mg of losartan twice daily).    Recommend she cut back to 1 mg of doxazosin at bedtime and just 100 mg of losartan daily.  Recheck in 1 month.    Scheduling consultation establish care Dr. Elizabeth given murmur and history of aortic stenosis.  No syncope           Patient Self-Management and Personalized Health Advice  The patient has been provided with information about: exercise and weight management and preventive services including:   · Annual Wellness Visit (AWV).    Outpatient Encounter Medications as of 10/10/2022   Medication Sig Dispense Refill   • amitriptyline (ELAVIL) 10 MG tablet Take 1 tablet by mouth Every Night. 90 tablet 1   • aspirin 81 MG EC tablet Take 81 mg by mouth Daily.     • doxazosin (CARDURA) 1 MG tablet Take 1 tablet by mouth Every Night. 90 tablet 1   • Fluticasone Furoate-Vilanterol (Breo Ellipta) 200-25 MCG/INH inhaler Inhale 1 puff Daily. 1 each 5   • furosemide (LASIX) 20 MG tablet Take 1 tablet by mouth Daily. 90 tablet 1   • losartan (COZAAR) 100 MG tablet Take 1 tablet by mouth Daily. 90 tablet 1   • meloxicam (MOBIC) 7.5 MG tablet Take 1 tablet by mouth Daily. 90 tablet 1   • nebivolol (Bystolic) 10 MG tablet Take 1 tablet by mouth Daily. 90 tablet 1   • omeprazole (priLOSEC) 40 MG capsule Take 1 capsule by mouth Daily. 90 capsule 1   • rosuvastatin (CRESTOR) 10 MG tablet Take 1 tablet by mouth Every Night. 90 tablet 1   • [DISCONTINUED] amitriptyline (ELAVIL) 10 MG tablet Take 1 tablet by mouth Every Night.     • [DISCONTINUED] benzonatate (TESSALON) 100 MG capsule Take 1 capsule by mouth 3 (Three) Times a Day As Needed for Cough. 42 capsule 0    • [DISCONTINUED] BYSTOLIC 10 MG tablet Take 10 mg by mouth 2 (Two) Times a Day.  1   • [DISCONTINUED] doxazosin (CARDURA) 1 MG tablet Take 1 mg by mouth 2 (Two) Times a Day.     • [DISCONTINUED] furosemide (LASIX) 20 MG tablet Take 20 mg by mouth 2 (Two) Times a Day.     • [DISCONTINUED] ibuprofen (ADVIL,MOTRIN) 200 MG tablet Every 6 (Six) Hours.     • [DISCONTINUED] losartan (COZAAR) 100 MG tablet Take 100 mg by mouth 2 (Two) Times a Day.     • [DISCONTINUED] losartan (COZAAR) 50 MG tablet Take 50 mg by mouth 2 (Two) Times a Day.  1   • [DISCONTINUED] meloxicam (MOBIC) 7.5 MG tablet Take 1 tablet by mouth Daily.     • [DISCONTINUED] omeprazole (priLOSEC) 40 MG capsule Take 1 capsule by mouth Daily.     • [DISCONTINUED] promethazine-dextromethorphan (PROMETHAZINE-DM) 6.25-15 MG/5ML syrup TAKE 5 ML BY MOUTH EVERY NIGHT AT BEDTIME 118 mL 0   • [DISCONTINUED] promethazine-dextromethorphan (PROMETHAZINE-DM) 6.25-15 MG/5ML syrup TAKE 5 ML BY MOUTH EVERY NIGHT AT BEDTIME 118 mL 0   • [DISCONTINUED] rosuvastatin (CRESTOR) 10 MG tablet Take 10 mg by mouth Every Night.  1   • [DISCONTINUED] traZODone (DESYREL) 50 MG tablet Take 1 tablet by mouth Every Night.     • [DISCONTINUED] VOLTAREN 1 % gel gel APPLY 2 - 4 GRAMS UP TO 4 XD TO THE PAINFUL JOINT UTD  3   • albuterol sulfate  (90 Base) MCG/ACT inhaler Inhale 2 puffs Every 4 (Four) Hours As Needed for Wheezing. 6.7 g 11     No facility-administered encounter medications on file as of 10/10/2022.       Reviewed use of high risk medication in the elderly: yes  Reviewed for potential of harmful drug interactions in the elderly: yes    Diagnoses and all orders for this visit:    1. General medical exam (Primary)  Assessment & Plan:  Reviewed annual wellness visit along with health maintenance and screening.    She will consider vaccination update but declines DEXA.    Encouraged her to get a copy of her advance directives on chart with us.      Discussed her recent  loss of her son and her daughter's terminal illness and grief counseling recommendation.  She will give it some consideration.    We will get her established with Dr. Elizabeth due to her aortic stenosis and episodes of dizziness.  We did discuss reducing her Cardura to 1 mg at bedtime given some concerns this might be contributing to dizziness along with reduction in her losartan dosing to just 100 mg daily.  Recheck in 1 month or sooner if problems arise        2. Essential hypertension  -     doxazosin (CARDURA) 1 MG tablet; Take 1 tablet by mouth Every Night.  Dispense: 90 tablet; Refill: 1  -     furosemide (LASIX) 20 MG tablet; Take 1 tablet by mouth Daily.  Dispense: 90 tablet; Refill: 1  -     losartan (COZAAR) 100 MG tablet; Take 1 tablet by mouth Daily.  Dispense: 90 tablet; Refill: 1  -     nebivolol (Bystolic) 10 MG tablet; Take 1 tablet by mouth Daily.  Dispense: 90 tablet; Refill: 1  -     Ambulatory Referral to Cardiology  -     CBC Auto Differential; Future  -     Comprehensive Metabolic Panel; Future  -     Lipid Panel; Future  -     TSH; Future  -     T4, Free; Future  -     CBC Auto Differential  -     Comprehensive Metabolic Panel  -     Lipid Panel  -     TSH  -     T4, Free    3. Heart murmur  Assessment & Plan:  Scheduling consultation with Dr. Elizabeth to establish care    Orders:  -     Ambulatory Referral to Cardiology    4. Chronic cough  Assessment & Plan:  Work-up with pulmonology and diagnosis of emphysema.  On Breo with as needed albuterol inhaler.  Secondhand smoke      5. Centrilobular emphysema (HCC)    6. Hyperlipidemia, mixed  Assessment & Plan:  Lipid abnormalities are improving with treatment.  Pharmacotherapy as ordered.  Lipids will be reassessed in 6 months.    Has been out of Crestor but will restart.  She would like to go ahead and get blood work done today even though she knows the levels may be affected by her running out of Crestor    Orders:  -     rosuvastatin (CRESTOR) 10  MG tablet; Take 1 tablet by mouth Every Night.  Dispense: 90 tablet; Refill: 1  -     Ambulatory Referral to Cardiology  -     CBC Auto Differential; Future  -     Comprehensive Metabolic Panel; Future  -     Lipid Panel; Future  -     TSH; Future  -     T4, Free; Future  -     CBC Auto Differential  -     Comprehensive Metabolic Panel  -     Lipid Panel  -     TSH  -     T4, Free    7. Generalized osteoarthritis of multiple sites  Assessment & Plan:  Continues with Mobic low-dose and Tylenol.  Onset understands long-term risk with Mobic but it greatly helps quality of life    Orders:  -     meloxicam (MOBIC) 7.5 MG tablet; Take 1 tablet by mouth Daily.  Dispense: 90 tablet; Refill: 1    8. GERD without esophagitis  Assessment & Plan:  Continue omeprazole for GERD and GI protection with Mobic    Orders:  -     omeprazole (priLOSEC) 40 MG capsule; Take 1 capsule by mouth Daily.  Dispense: 90 capsule; Refill: 1    9. Insomnia, persistent  Assessment & Plan:  Did very well with low-dose amitriptyline for neuropathy and nighttime insomnia issues.  This also helps sustain appetite.    She has been off trazodone.    Refilled amitriptyline    Orders:  -     amitriptyline (ELAVIL) 10 MG tablet; Take 1 tablet by mouth Every Night.  Dispense: 90 tablet; Refill: 1    10. Impacted cerumen of both ears  Assessment & Plan:  Resolved with lavage in office today      11. Dizziness  Assessment & Plan:  Left greater than right cerumen impaction resolved in the office today.    Discussed this may be contributing to her episodes or her medications could be contributing (1 mg doxazosin twice daily and 100 mg of losartan twice daily).    Recommend she cut back to 1 mg of doxazosin at bedtime and just 100 mg of losartan daily.  Recheck in 1 month.    Scheduling consultation establish care Dr. Elizabeth given murmur and history of aortic stenosis.  No syncope       12. Other specified hearing loss of both ears  Assessment & Plan:  Improved  with removal of cerumen impaction      13. Grieving  Assessment & Plan:  See above.  Encourage hospice grief counseling      14. Aortic stenosis, moderate  Assessment & Plan:  Scheduling consultation to establish care with Dr. Elizabeth        Follow Up:  Return in about 6 weeks (around 11/21/2022).     There are no Patient Instructions on file for this visit.    An After Visit Summary and PPPS with all of these plans were given to the patient.

## 2022-10-10 NOTE — ASSESSMENT & PLAN NOTE
"  Your surgery is scheduled for _Wednnesday Dec. 20, 2017___________________.    Call [phone] between 2 p.m. and 5 p.m. on   _Tuesday______________ to find out your arrival time for the day of your surgery.      Please report to SAME DAY SURGERY UNIT on the 2nd FLOOR at _______ a.m.  Use front door entrance. The doors open at 0530 am.      If you need WHEELCHAIR assistance please call  [phone] from your cell phone or "[phone]"  from the  hospital courtesy phone located to the right after you enter the hospital lobby.      INSTRUCTIONS IMPORTANT!!!  ¨ Do not eat or drink after 12 midnight-including water. OK to brush teeth, no   gum, candy or mints!    ¨ Take only these medicines with a small swallow of water-morning of surgery.  Take Amlodipine with swallow of water            x____  Prep instructions:   SHOWER     _x___  Please shower using Hibiclens soap the night before AND  the morning of   your surgery/procedure. Do not use Hibiclens on your face or genitals      x         If your surgery is around your belly button (Navel) be sure to wash inside your  belly button also. Rinse hibiclens off completely.  x____  No shaving of procedural area at least 4-5 days before surgery due to  increased risk of skin irritation and/or possible infection.  x____  Do not wear makeup, including mascara. WEARING EYE MAKEUP MAY LEAD TO SERIOUS EYE INJURY during surgery.  _x___  No powder, lotions or creams to your body.  _x___  You may wear only deodorant on the day of surgery.  __x__  Please remove all jewelry, including piercings and leave at home.  _x___  No money or valuables needed. Please leave at home.  You may bring your   cell phone.  ____  Please bring any documents given by your doctor.  _x___  If going home the same day, arrange for a ride home. You will not be able to   drive if Anesthesia was used.  ____  Children under 18 years require a parent / guardian present the entire time   they are in surgery / recovery.  _x___  " Reviewed annual wellness visit along with health maintenance and screening.    She will consider vaccination update but declines DEXA.    Encouraged her to get a copy of her advance directives on chart with us.      Discussed her recent loss of her son and her daughter's terminal illness and grief counseling recommendation.  She will give it some consideration.    We will get her established with Dr. Elizabeth due to her aortic stenosis and episodes of dizziness.  We did discuss reducing her Cardura to 1 mg at bedtime given some concerns this might be contributing to dizziness along with reduction in her losartan dosing to just 100 mg daily.  Recheck in 1 month or sooner if problems arise     Wear loose fitting clothing. Allow for dressings, bandages.  x____  Stop Aspirin, Ibuprofen, Motrin and Aleve at least 3-5 days before  surgery, unless otherwise instructed by your doctor, or the nurse.              You MAY use Tylenol/acetaminophen until day of surgery.  ____  If you take diabetic medication, do not take am of surgery unless instructed by   Doctor.  __x__  Call MD for temperature above 101 degrees.        _x___ Stop taking any Fish Oil supplement or any Vitamins that contain Vitamin  E at least 5 days prior to surgery.            I have read or had read and explained to me, and understand the above information.  Additional comments or instructions:Please call   [phone] if you have any questions regarding the instructions above.

## 2022-10-10 NOTE — ASSESSMENT & PLAN NOTE
Left greater than right cerumen impaction resolved in the office today.    Discussed this may be contributing to her episodes or her medications could be contributing (1 mg doxazosin twice daily and 100 mg of losartan twice daily).    Recommend she cut back to 1 mg of doxazosin at bedtime and just 100 mg of losartan daily.  Recheck in 1 month.    Scheduling consultation establish care Dr. Elizabeth given murmur and history of aortic stenosis.  No syncope

## 2022-10-11 LAB
ALBUMIN SERPL-MCNC: 4.7 G/DL (ref 3.5–4.6)
ALBUMIN/GLOB SERPL: 2.2 {RATIO} (ref 1.2–2.2)
ALP SERPL-CCNC: 71 IU/L (ref 44–121)
ALT SERPL-CCNC: 7 IU/L (ref 0–32)
AST SERPL-CCNC: 12 IU/L (ref 0–40)
BASOPHILS # BLD AUTO: 0 X10E3/UL (ref 0–0.2)
BASOPHILS NFR BLD AUTO: 1 %
BILIRUB SERPL-MCNC: 0.4 MG/DL (ref 0–1.2)
BUN SERPL-MCNC: 18 MG/DL (ref 10–36)
BUN/CREAT SERPL: 22 (ref 12–28)
CALCIUM SERPL-MCNC: 9.4 MG/DL (ref 8.7–10.3)
CHLORIDE SERPL-SCNC: 103 MMOL/L (ref 96–106)
CHOLEST SERPL-MCNC: 239 MG/DL (ref 100–199)
CO2 SERPL-SCNC: 24 MMOL/L (ref 20–29)
CREAT SERPL-MCNC: 0.83 MG/DL (ref 0.57–1)
EGFRCR SERPLBLD CKD-EPI 2021: 67 ML/MIN/1.73
EOSINOPHIL # BLD AUTO: 0.2 X10E3/UL (ref 0–0.4)
EOSINOPHIL NFR BLD AUTO: 3 %
ERYTHROCYTE [DISTWIDTH] IN BLOOD BY AUTOMATED COUNT: 12.3 % (ref 11.7–15.4)
GLOBULIN SER CALC-MCNC: 2.1 G/DL (ref 1.5–4.5)
GLUCOSE SERPL-MCNC: 106 MG/DL (ref 70–99)
HCT VFR BLD AUTO: 34.7 % (ref 34–46.6)
HDLC SERPL-MCNC: 95 MG/DL
HGB BLD-MCNC: 11.4 G/DL (ref 11.1–15.9)
IMM GRANULOCYTES # BLD AUTO: 0 X10E3/UL (ref 0–0.1)
IMM GRANULOCYTES NFR BLD AUTO: 0 %
LDLC SERPL CALC-MCNC: 128 MG/DL (ref 0–99)
LYMPHOCYTES # BLD AUTO: 1 X10E3/UL (ref 0.7–3.1)
LYMPHOCYTES NFR BLD AUTO: 13 %
MCH RBC QN AUTO: 30.6 PG (ref 26.6–33)
MCHC RBC AUTO-ENTMCNC: 32.9 G/DL (ref 31.5–35.7)
MCV RBC AUTO: 93 FL (ref 79–97)
MONOCYTES # BLD AUTO: 0.7 X10E3/UL (ref 0.1–0.9)
MONOCYTES NFR BLD AUTO: 9 %
NEUTROPHILS # BLD AUTO: 5.4 X10E3/UL (ref 1.4–7)
NEUTROPHILS NFR BLD AUTO: 74 %
PLATELET # BLD AUTO: 338 X10E3/UL (ref 150–450)
POTASSIUM SERPL-SCNC: 4 MMOL/L (ref 3.5–5.2)
PROT SERPL-MCNC: 6.8 G/DL (ref 6–8.5)
RBC # BLD AUTO: 3.73 X10E6/UL (ref 3.77–5.28)
SODIUM SERPL-SCNC: 142 MMOL/L (ref 134–144)
T4 FREE SERPL-MCNC: 1.12 NG/DL (ref 0.82–1.77)
TRIGL SERPL-MCNC: 95 MG/DL (ref 0–149)
TSH SERPL DL<=0.005 MIU/L-ACNC: 3.67 UIU/ML (ref 0.45–4.5)
VLDLC SERPL CALC-MCNC: 16 MG/DL (ref 5–40)
WBC # BLD AUTO: 7.4 X10E3/UL (ref 3.4–10.8)

## 2022-12-13 ENCOUNTER — TELEPHONE (OUTPATIENT)
Dept: FAMILY MEDICINE CLINIC | Facility: CLINIC | Age: 87
End: 2022-12-13

## 2022-12-13 NOTE — TELEPHONE ENCOUNTER
Caller: [facility] - [facility]    Relationship:     Best call back number:  [phone]    What is the best time to reach you: ANY    Who are you requesting to speak with (clinical staff, provider,  specific staff member): CLINICAL     What was the call regarding:     THEY SENT OVER A MEDICATION LIST ON 11/10 TO BE SIGNED BY THE DOCTOR   SHE HAS NOT RECEIVED IT BACK SIGNED    FAX - [phone]    Do you require a callback: YES

## 2022-12-30 ENCOUNTER — TELEPHONE (OUTPATIENT)
Dept: FAMILY MEDICINE CLINIC | Facility: CLINIC | Age: 87
End: 2022-12-30
Payer: MEDICARE

## 2022-12-30 NOTE — TELEPHONE ENCOUNTER
Caller: CAMILO MEADOWS    Relationship:     Best call back number: [phone]    What orders are you requesting (i.e. lab or imaging):  HOME HEALTH PHYSICAL THERAPY     Additional notes:     PATIENT IS IN ASSISTED LIVING IN [place], [place] AFTER [facility]    GRANDDAUGHTER LIVES IN [place]

## 2023-01-05 ENCOUNTER — TELEPHONE (OUTPATIENT)
Dept: FAMILY MEDICINE CLINIC | Facility: CLINIC | Age: 88
End: 2023-01-05

## 2023-01-20 ENCOUNTER — TELEPHONE (OUTPATIENT)
Dept: FAMILY MEDICINE CLINIC | Facility: CLINIC | Age: 88
End: 2023-01-20
Payer: MEDICARE

## 2023-01-20 NOTE — TELEPHONE ENCOUNTER
Caller: LEATHA MCGEE    Relationship: Grandchild    Best call back number: [phone]    What is the best time to reach you: ANY    Who are you requesting to speak with (clinical staff, provider,  specific staff member): CLINICAL       What was the call regarding: GRANDDAUGHTER NOW HAS MEDICAL POWER OF , NEEDS TO KNOW HOW TO GET THAT TO US BUT ALSO NEEDS TO KNOW IF SHE HAS HAD ANY COGNITIVE TESTING DONE OR TOLD SHE CAN NOT DRIVE. PATIENT HAS BEEN MOVED TO ASSISTED LIVING FACILITY NOW BUT IS FIGHTING THEM ON TRYING TO GET OUT AND DRIVE.      Do you require a callback: YES

## 2023-01-20 NOTE — TELEPHONE ENCOUNTER
She will need to bring in the power of  and signed HIPAA release in order for us to discuss any details about Mounika's care over the phone.    I would recommend that they schedule a follow-up visit and come in together and we can go over the current issues that have been ongoing and discuss other cognitive testing together.    Again until we have a signed hipaa and a copy of the power of  we cannot discuss medical issues with her family without her consent

## 2023-01-23 NOTE — TELEPHONE ENCOUNTER
Spoke to patients granddaughter and she is going to call back with date. Can she just drop off paperwork and then set up a telehealth visit since the patient is now in a nursing home and call [facility]

## 2023-01-23 NOTE — TELEPHONE ENCOUNTER
Dayana, granddaughter, returned Latosha's call. Message was given. She said that she will work on getting the paperwork to us because she lives in [place]. She said once she figures that out, she will work on trying to schedule a telehealth visit.

## 2023-01-30 ENCOUNTER — EXTERNAL PBMM DATA (OUTPATIENT)
Dept: PHARMACY | Facility: OTHER | Age: 88
End: 2023-01-30
Payer: MEDICARE

## 2023-02-01 ENCOUNTER — TELEPHONE (OUTPATIENT)
Dept: FAMILY MEDICINE CLINIC | Facility: CLINIC | Age: 88
End: 2023-02-01
Payer: MEDICARE

## 2023-02-01 NOTE — TELEPHONE ENCOUNTER
Caller: CAMILO WITH [facility]    Relationship: Home Health    Best call back number: [phone]  What orders are you requesting (i.e. lab or imaging): NURSING ASSESSMENT DUE TO COVID DIAGNOSIS     In what timeframe would the patient need to come in: AS SOON AS POSSIBLE     Where will you receive your lab/imaging services: HOME     Additional notes: LUNGS ARE NOT SOUNDING CLEAR AND WANTED TO GET NURSING EXAM ORDERED

## 2023-02-02 NOTE — TELEPHONE ENCOUNTER
Okay to have home health go out for a nursing evaluation.    Please have them schedule her for an in-office visit as well.  If she is having chest congestion issues and breathing problems we do also have the Saturday clinic if she needs to be seen before next week    Of course… If she is having more problems with respiratory distress she would need evaluation in the ER immediately and not wait for home health to be able to evaluate her come in for an office visit.

## 2023-02-03 ENCOUNTER — TELEPHONE (OUTPATIENT)
Dept: FAMILY MEDICINE CLINIC | Facility: CLINIC | Age: 88
End: 2023-02-03
Payer: MEDICARE

## 2023-02-03 NOTE — TELEPHONE ENCOUNTER
Spoke to zaida about home health she states that dangelo will call with an update after her visit today

## 2023-02-03 NOTE — TELEPHONE ENCOUNTER
Caller: [facility]    Relationship:     Best call back number:[phone]    What was the call regarding: [facility] WITH [facility] CALLED TO REQUEST VERBAL ORDERS FOR NURSING TWICE A WEEK FOR 1 WEEK AND ONCE A WEEK FOR 3 WEEKS.    PATIENT ALSO HAS RHONCHI ON THE BILATERAL OWER PART OF LUNGS.  SHE ASKED IF THE PATIENT NEEDED TO HAVE AN XRAY DONE IN THE HOME OR AT THE OFFICE.  SHE WANTED TO CHECK TO SEE IF SHE HAD PNEUMONIA.   PATIENT HAD COVID A FEW WEEKS AGO.     Do you require a callback: YES

## 2023-02-08 ENCOUNTER — TELEPHONE (OUTPATIENT)
Dept: FAMILY MEDICINE CLINIC | Facility: CLINIC | Age: 88
End: 2023-02-08
Payer: MEDICARE

## 2023-02-27 ENCOUNTER — EXTERNAL PBMM DATA (OUTPATIENT)
Dept: PHARMACY | Facility: OTHER | Age: 88
End: 2023-02-27
Payer: MEDICARE

## 2023-06-13 NOTE — TELEPHONE ENCOUNTER
Tired to call patient on both phone numbers in chart and unable to reach her, please see message below    HUB CAN READ   aerobic capacity/endurance/gait, locomotion, and balance/muscle strength/poor safety awareness